# Patient Record
Sex: MALE | Race: WHITE | NOT HISPANIC OR LATINO | Employment: OTHER | URBAN - METROPOLITAN AREA
[De-identification: names, ages, dates, MRNs, and addresses within clinical notes are randomized per-mention and may not be internally consistent; named-entity substitution may affect disease eponyms.]

---

## 2023-11-01 DIAGNOSIS — E78.5 HYPERLIPIDEMIA, UNSPECIFIED HYPERLIPIDEMIA TYPE: Primary | ICD-10-CM

## 2023-11-01 RX ORDER — SIMVASTATIN 40 MG/1
40 TABLET, FILM COATED ORAL DAILY
COMMUNITY
End: 2023-11-01 | Stop reason: SDUPTHER

## 2023-11-01 RX ORDER — SIMVASTATIN 40 MG/1
40 TABLET, FILM COATED ORAL DAILY
Qty: 90 TABLET | Refills: 0 | Status: SHIPPED | OUTPATIENT
Start: 2023-11-01 | End: 2024-02-07

## 2024-02-06 DIAGNOSIS — E78.5 HYPERLIPIDEMIA, UNSPECIFIED HYPERLIPIDEMIA TYPE: ICD-10-CM

## 2024-02-06 PROBLEM — S64.40XA INJURY OF DIGITAL NERVE OF FINGER OF LEFT HAND: Status: ACTIVE | Noted: 2021-11-15

## 2024-02-06 PROBLEM — J61 PULMONARY ASBESTOSIS (MULTI): Status: ACTIVE | Noted: 2024-02-06

## 2024-02-06 PROBLEM — N40.0 ENLARGED PROSTATE WITHOUT LOWER URINARY TRACT SYMPTOMS (LUTS): Status: ACTIVE | Noted: 2024-02-06

## 2024-02-06 PROBLEM — B02.9 HERPES ZOSTER WITHOUT COMPLICATION: Status: ACTIVE | Noted: 2024-02-06

## 2024-02-06 PROBLEM — D64.9 ANEMIA: Status: ACTIVE | Noted: 2024-02-06

## 2024-02-06 PROBLEM — S61.209A FLEXOR TENDON LACERATION, FINGER, OPEN WOUND, INITIAL ENCOUNTER: Status: ACTIVE | Noted: 2021-11-15

## 2024-02-06 PROBLEM — S62.609B: Chronic | Status: ACTIVE | Noted: 2021-11-15

## 2024-02-06 PROBLEM — R91.1 LUNG NODULE, SOLITARY: Status: ACTIVE | Noted: 2024-02-06

## 2024-02-06 PROBLEM — M25.649 STIFFNESS OF THUMB JOINT: Status: ACTIVE | Noted: 2022-01-25

## 2024-02-06 PROBLEM — R73.9 HYPERGLYCEMIA: Status: ACTIVE | Noted: 2024-02-06

## 2024-02-06 PROBLEM — S56.129A FLEXOR TENDON LACERATION, FINGER, OPEN WOUND, INITIAL ENCOUNTER: Status: ACTIVE | Noted: 2021-11-15

## 2024-02-06 PROBLEM — I25.10 CAD (CORONARY ARTERY DISEASE): Status: ACTIVE | Noted: 2024-02-06

## 2024-02-06 PROBLEM — E55.9 VITAMIN D DEFICIENCY: Status: ACTIVE | Noted: 2024-02-06

## 2024-02-06 PROBLEM — M54.9 BACKACHE: Status: ACTIVE | Noted: 2024-02-06

## 2024-02-06 PROBLEM — H93.13 TINNITUS OF BOTH EARS: Status: ACTIVE | Noted: 2024-02-06

## 2024-02-06 PROBLEM — M25.642 FINGER STIFFNESS, LEFT: Chronic | Status: ACTIVE | Noted: 2022-02-11

## 2024-02-06 RX ORDER — PREDNISOLONE ACETATE 10 MG/ML
1 SUSPENSION/ DROPS OPHTHALMIC 4 TIMES DAILY
COMMUNITY
Start: 2024-01-04

## 2024-02-06 RX ORDER — TAMSULOSIN HYDROCHLORIDE 0.4 MG/1
0.4 CAPSULE ORAL DAILY
COMMUNITY

## 2024-02-06 RX ORDER — TRIAMCINOLONE ACETONIDE 1 MG/G
1 CREAM TOPICAL 2 TIMES DAILY
COMMUNITY
Start: 2013-01-25

## 2024-02-06 RX ORDER — DOCUSATE SODIUM 100 MG/1
100 CAPSULE, LIQUID FILLED ORAL 2 TIMES DAILY
COMMUNITY
Start: 2021-11-18

## 2024-02-06 RX ORDER — LATANOPROST 50 UG/ML
1 SOLUTION/ DROPS OPHTHALMIC NIGHTLY
COMMUNITY

## 2024-02-06 RX ORDER — OFLOXACIN 3 MG/ML
1 SOLUTION/ DROPS OPHTHALMIC 4 TIMES DAILY
COMMUNITY
Start: 2024-01-04

## 2024-02-06 RX ORDER — ACETAMINOPHEN 500 MG
500 TABLET ORAL 2 TIMES DAILY
COMMUNITY
Start: 2021-11-18

## 2024-02-06 RX ORDER — SILDENAFIL CITRATE 20 MG/1
2 TABLET ORAL
COMMUNITY
Start: 2021-02-05

## 2024-02-07 RX ORDER — SIMVASTATIN 40 MG/1
40 TABLET, FILM COATED ORAL DAILY
Qty: 90 TABLET | Refills: 0 | Status: SHIPPED | OUTPATIENT
Start: 2024-02-07 | End: 2024-05-01

## 2024-05-01 DIAGNOSIS — E78.5 HYPERLIPIDEMIA, UNSPECIFIED HYPERLIPIDEMIA TYPE: ICD-10-CM

## 2024-05-01 RX ORDER — SIMVASTATIN 40 MG/1
40 TABLET, FILM COATED ORAL DAILY
Qty: 90 TABLET | Refills: 0 | Status: SHIPPED | OUTPATIENT
Start: 2024-05-01

## 2024-07-27 DIAGNOSIS — E78.5 HYPERLIPIDEMIA, UNSPECIFIED HYPERLIPIDEMIA TYPE: ICD-10-CM

## 2024-07-30 RX ORDER — SIMVASTATIN 40 MG/1
40 TABLET, FILM COATED ORAL DAILY
Qty: 90 TABLET | Refills: 0 | Status: SHIPPED | OUTPATIENT
Start: 2024-07-30

## 2024-08-05 ENCOUNTER — APPOINTMENT (OUTPATIENT)
Dept: PRIMARY CARE | Facility: CLINIC | Age: 84
End: 2024-08-05
Payer: MEDICARE

## 2024-08-05 VITALS
SYSTOLIC BLOOD PRESSURE: 123 MMHG | WEIGHT: 163 LBS | HEART RATE: 87 BPM | DIASTOLIC BLOOD PRESSURE: 75 MMHG | TEMPERATURE: 98 F | BODY MASS INDEX: 25.58 KG/M2 | HEIGHT: 67 IN

## 2024-08-05 DIAGNOSIS — E78.2 MIXED HYPERLIPIDEMIA: ICD-10-CM

## 2024-08-05 DIAGNOSIS — E55.9 VITAMIN D DEFICIENCY: ICD-10-CM

## 2024-08-05 DIAGNOSIS — N40.0 ENLARGED PROSTATE WITHOUT LOWER URINARY TRACT SYMPTOMS (LUTS): ICD-10-CM

## 2024-08-05 DIAGNOSIS — Z00.00 HEALTH MAINTENANCE EXAMINATION: Primary | ICD-10-CM

## 2024-08-05 DIAGNOSIS — E78.5 HYPERLIPIDEMIA, UNSPECIFIED HYPERLIPIDEMIA TYPE: ICD-10-CM

## 2024-08-05 DIAGNOSIS — Z23 ENCOUNTER FOR IMMUNIZATION: ICD-10-CM

## 2024-08-05 DIAGNOSIS — Z13.29 THYROID DISORDER SCREENING: ICD-10-CM

## 2024-08-05 PROBLEM — B02.9 HERPES ZOSTER WITHOUT COMPLICATION: Status: RESOLVED | Noted: 2024-02-06 | Resolved: 2024-08-05

## 2024-08-05 PROBLEM — D64.9 ANEMIA: Status: RESOLVED | Noted: 2024-02-06 | Resolved: 2024-08-05

## 2024-08-05 PROBLEM — S61.209A FLEXOR TENDON LACERATION, FINGER, OPEN WOUND, INITIAL ENCOUNTER: Status: RESOLVED | Noted: 2021-11-15 | Resolved: 2024-08-05

## 2024-08-05 PROBLEM — M25.649 STIFFNESS OF THUMB JOINT: Status: RESOLVED | Noted: 2022-01-25 | Resolved: 2024-08-05

## 2024-08-05 PROBLEM — S62.609B: Chronic | Status: RESOLVED | Noted: 2021-11-15 | Resolved: 2024-08-05

## 2024-08-05 PROBLEM — M25.642 FINGER STIFFNESS, LEFT: Chronic | Status: RESOLVED | Noted: 2022-02-11 | Resolved: 2024-08-05

## 2024-08-05 PROBLEM — R91.1 LUNG NODULE, SOLITARY: Status: RESOLVED | Noted: 2024-02-06 | Resolved: 2024-08-05

## 2024-08-05 PROBLEM — S64.40XA INJURY OF DIGITAL NERVE OF FINGER OF LEFT HAND: Status: RESOLVED | Noted: 2021-11-15 | Resolved: 2024-08-05

## 2024-08-05 PROBLEM — S56.129A FLEXOR TENDON LACERATION, FINGER, OPEN WOUND, INITIAL ENCOUNTER: Status: RESOLVED | Noted: 2021-11-15 | Resolved: 2024-08-05

## 2024-08-05 PROBLEM — J61 PULMONARY ASBESTOSIS (MULTI): Status: RESOLVED | Noted: 2024-02-06 | Resolved: 2024-08-05

## 2024-08-05 PROBLEM — M54.9 BACKACHE: Status: RESOLVED | Noted: 2024-02-06 | Resolved: 2024-08-05

## 2024-08-05 PROCEDURE — 1159F MED LIST DOCD IN RCRD: CPT | Performed by: INTERNAL MEDICINE

## 2024-08-05 PROCEDURE — G0009 ADMIN PNEUMOCOCCAL VACCINE: HCPCS | Performed by: INTERNAL MEDICINE

## 2024-08-05 PROCEDURE — 1170F FXNL STATUS ASSESSED: CPT | Performed by: INTERNAL MEDICINE

## 2024-08-05 PROCEDURE — 99397 PER PM REEVAL EST PAT 65+ YR: CPT | Performed by: INTERNAL MEDICINE

## 2024-08-05 PROCEDURE — 1036F TOBACCO NON-USER: CPT | Performed by: INTERNAL MEDICINE

## 2024-08-05 PROCEDURE — G0439 PPPS, SUBSEQ VISIT: HCPCS | Performed by: INTERNAL MEDICINE

## 2024-08-05 PROCEDURE — 90677 PCV20 VACCINE IM: CPT | Performed by: INTERNAL MEDICINE

## 2024-08-05 PROCEDURE — 1160F RVW MEDS BY RX/DR IN RCRD: CPT | Performed by: INTERNAL MEDICINE

## 2024-08-05 PROCEDURE — 99213 OFFICE O/P EST LOW 20 MIN: CPT | Performed by: INTERNAL MEDICINE

## 2024-08-05 RX ORDER — SIMVASTATIN 40 MG/1
40 TABLET, FILM COATED ORAL DAILY
Qty: 90 TABLET | Refills: 3 | Status: SHIPPED | OUTPATIENT
Start: 2024-08-05 | End: 2025-08-05

## 2024-08-05 ASSESSMENT — ACTIVITIES OF DAILY LIVING (ADL)
HEARING - RIGHT EAR: FUNCTIONAL
HEARING - LEFT EAR: FUNCTIONAL
GROCERY_SHOPPING: INDEPENDENT
ADEQUATE_TO_COMPLETE_ADL: YES
TOILETING: INDEPENDENT
DRESSING YOURSELF: INDEPENDENT
FEEDING YOURSELF: INDEPENDENT
WALKS IN HOME: INDEPENDENT
JUDGMENT_ADEQUATE_SAFELY_COMPLETE_DAILY_ACTIVITIES: YES
GROOMING: INDEPENDENT
TAKING_MEDICATION: INDEPENDENT
PATIENT'S MEMORY ADEQUATE TO SAFELY COMPLETE DAILY ACTIVITIES?: YES
BATHING: INDEPENDENT
DOING_HOUSEWORK: INDEPENDENT
MANAGING_FINANCES: INDEPENDENT

## 2024-08-05 ASSESSMENT — ENCOUNTER SYMPTOMS
COUGH: 0
DIARRHEA: 0
OCCASIONAL FEELINGS OF UNSTEADINESS: 0
DIZZINESS: 0
BLOOD IN STOOL: 0
LIGHT-HEADEDNESS: 0
SHORTNESS OF BREATH: 0
DYSURIA: 0
CHILLS: 0
SORE THROAT: 0
FEVER: 0
LOSS OF SENSATION IN FEET: 0
AGITATION: 0
PALPITATIONS: 0
CONSTIPATION: 0
VOMITING: 0
DEPRESSION: 0
ABDOMINAL PAIN: 0
NAUSEA: 0

## 2024-08-05 ASSESSMENT — COLUMBIA-SUICIDE SEVERITY RATING SCALE - C-SSRS
1. IN THE PAST MONTH, HAVE YOU WISHED YOU WERE DEAD OR WISHED YOU COULD GO TO SLEEP AND NOT WAKE UP?: NO
6. HAVE YOU EVER DONE ANYTHING, STARTED TO DO ANYTHING, OR PREPARED TO DO ANYTHING TO END YOUR LIFE?: NO
2. HAVE YOU ACTUALLY HAD ANY THOUGHTS OF KILLING YOURSELF?: NO

## 2024-08-05 ASSESSMENT — PATIENT HEALTH QUESTIONNAIRE - PHQ9
SUM OF ALL RESPONSES TO PHQ9 QUESTIONS 1 AND 2: 0
1. LITTLE INTEREST OR PLEASURE IN DOING THINGS: NOT AT ALL
2. FEELING DOWN, DEPRESSED OR HOPELESS: NOT AT ALL

## 2024-08-05 NOTE — PROGRESS NOTES
Subjective   Reason for Visit: Temo Becerra is an 84 y.o. male here for a Medicare Wellness visit.     Past Medical, Surgical, and Family History reviewed and updated in chart.    Reviewed all medications by prescribing practitioner or clinical pharmacist (such as prescriptions, OTCs, herbal therapies and supplements) and documented in the medical record.    HPI  Patient presents for AWV. Patient follows with urology once a year and an ophthalmologist every year.  Patient is up-to-date on age and gender recommended screening.  Patient up-to-date on age and gender recommended immunizations exception of pneumonia vaccine which will be given today will be due for influenza vaccine this fall.  Denies any fever, chills, chest pain or shortness of breath, nausea, vomit diarrhea, abdominal pain.  He is due for blood work as well which has been ordered.  We reviewed his most recent blood work from August 2023. He is currently on simvastatin with good control of cholesterol.  States he was being monitored for asbestosis as he had possible exposure to younger age but was told he did not need any further surveillance.  Does not actually have a history of asbestosis himself.  Denies any history of diabetes or prostate cancer.  Does follow with urology for enlarged prostate currently on Flomax.  Following with podiatry for mycotic toenails and dermatophytosis of nail    Patient Care Team:  Irving Agarwal DO as PCP - General (Internal Medicine)  Doug Emanuel MD as PCP - [employer] PCP     Review of Systems   Constitutional:  Negative for chills and fever.   HENT:  Negative for sore throat.    Eyes:  Negative for visual disturbance.   Respiratory:  Negative for cough and shortness of breath.    Cardiovascular:  Negative for chest pain, palpitations and leg swelling.   Gastrointestinal:  Negative for abdominal pain, blood in stool, constipation, diarrhea, nausea and vomiting.   Genitourinary:  Negative for  "dysuria.   Skin:  Negative for rash.   Neurological:  Negative for dizziness, syncope and light-headedness.   Psychiatric/Behavioral:  Negative for agitation.        Objective   Vitals:  /75   Pulse 87   Temp 36.7 °C (98 °F) (Temporal)   Ht 1.702 m (5' 7\")   Wt 73.9 kg (163 lb)   BMI 25.53 kg/m²       Physical Exam  Vitals and nursing note reviewed.   Constitutional:       General: He is not in acute distress.     Appearance: Normal appearance. He is not ill-appearing, toxic-appearing or diaphoretic.   HENT:      Mouth/Throat:      Mouth: Mucous membranes are moist.      Pharynx: Oropharynx is clear.   Eyes:      Extraocular Movements: Extraocular movements intact.      Pupils: Pupils are equal, round, and reactive to light.   Neck:      Vascular: No carotid bruit.   Cardiovascular:      Rate and Rhythm: Normal rate and regular rhythm.      Heart sounds: Normal heart sounds.   Pulmonary:      Effort: Pulmonary effort is normal. No respiratory distress.      Breath sounds: Normal breath sounds. No wheezing, rhonchi or rales.   Abdominal:      General: There is no distension.      Palpations: Abdomen is soft. There is no mass.      Tenderness: There is no abdominal tenderness.   Musculoskeletal:         General: No swelling.      Cervical back: Neck supple. No tenderness.      Right lower leg: No edema.      Left lower leg: No edema.   Lymphadenopathy:      Cervical: No cervical adenopathy.   Skin:     General: Skin is warm and dry.      Coloration: Skin is not jaundiced.      Findings: No bruising or rash.   Neurological:      General: No focal deficit present.      Mental Status: He is alert and oriented to person, place, and time.      Cranial Nerves: No cranial nerve deficit.   Psychiatric:         Mood and Affect: Mood normal.         Behavior: Behavior normal.         Thought Content: Thought content normal.         Judgment: Judgment normal.         Assessment/Plan   Problem List Items Addressed This " Visit             ICD-10-CM    Enlarged prostate without lower urinary tract symptoms (luts) N40.0    Hyperlipidemia E78.5    Relevant Medications    simvastatin (Zocor) 40 mg tablet    Other Relevant Orders    CBC and Auto Differential    Comprehensive metabolic panel    Lipid panel    Vitamin D deficiency E55.9    Relevant Orders    Vitamin D 25-Hydroxy,Total (for eval of Vitamin D levels)    Health maintenance examination - Primary Z00.00    Encounter for immunization Z23    Relevant Orders    Pneumococcal conjugate vaccine, 20-valent (PREVNAR 20) (Completed)    Thyroid disorder screening Z13.29    Relevant Orders    TSH with reflex to Free T4 if abnormal     Health maintenance examination: Patient is up-to-date on age and gender recommended screening.  He is up-to-date on age and gender recommended immunizations with exception of Prevnar 20 which will be given today.  He defers influenza and RSV vaccine at this time.  Defers COVID-19 vaccination.  States he was remote smoker very sparingly but quit over 40 years ago.  He is due for blood work which has been ordered today    Large prostate without lower urinary tract symptoms: Following with urology on Flomax and tolerating it well without adverse effects symptoms controlled.    Hyperlipidemia: Chronic, stable refills provided for simvastatin blood work has been ordered to check cholesterol level    Vitamin D deficiency: Check a vitamin D level    Encounter for immunization: Prevnar 20 will be given today

## 2024-08-08 LAB
NON-UH HIE A/G RATIO: 1.3
NON-UH HIE ALB: 3.8 G/DL (ref 3.4–5)
NON-UH HIE ALK PHOS: 93 UNIT/L (ref 45–117)
NON-UH HIE BASO COUNT: 0.03 X1000 (ref 0–0.2)
NON-UH HIE BASOS %: 0.6 %
NON-UH HIE BILIRUBIN, TOTAL: 0.5 MG/DL (ref 0.3–1.2)
NON-UH HIE BUN/CREAT RATIO: 12.7
NON-UH HIE BUN: 14 MG/DL (ref 9–23)
NON-UH HIE CALCIUM: 9.3 MG/DL (ref 8.7–10.4)
NON-UH HIE CALCULATED LDL CHOLESTEROL: 58 MG/DL (ref 60–130)
NON-UH HIE CALCULATED OSMOLALITY: 289 MOSM/KG (ref 275–295)
NON-UH HIE CHLORIDE: 109 MMOL/L (ref 98–107)
NON-UH HIE CHOLESTEROL: 120 MG/DL (ref 100–200)
NON-UH HIE CO2, VENOUS: 29 MMOL/L (ref 20–31)
NON-UH HIE CREATININE: 1.1 MG/DL (ref 0.6–1.1)
NON-UH HIE DIFF?: NO
NON-UH HIE EOS COUNT: 0.19 X1000 (ref 0–0.5)
NON-UH HIE EOSIN %: 3.6 %
NON-UH HIE GFR AA: >60
NON-UH HIE GLOBULIN: 3 G/DL
NON-UH HIE GLOMERULAR FILTRATION RATE: >60 ML/MIN/1.73M?
NON-UH HIE GLUCOSE: 121 MG/DL (ref 74–106)
NON-UH HIE GOT: 19 UNIT/L (ref 15–37)
NON-UH HIE GPT: 20 UNIT/L (ref 10–49)
NON-UH HIE HCT: 43.5 % (ref 41–52)
NON-UH HIE HDL CHOLESTEROL: 51 MG/DL (ref 40–60)
NON-UH HIE HGB: 14.6 G/DL (ref 13.5–17.5)
NON-UH HIE INSTR WBC: 5.3
NON-UH HIE K: 4.2 MMOL/L (ref 3.5–5.1)
NON-UH HIE LYMPH %: 30.3 %
NON-UH HIE LYMPH COUNT: 1.61 X1000 (ref 1.2–4.8)
NON-UH HIE MCH: 29.8 PG (ref 27–34)
NON-UH HIE MCHC: 33.6 G/DL (ref 32–37)
NON-UH HIE MCV: 88.7 FL (ref 80–100)
NON-UH HIE MONO %: 8.2 %
NON-UH HIE MONO COUNT: 0.44 X1000 (ref 0.1–1)
NON-UH HIE MPV: 8.2 FL (ref 7.4–10.4)
NON-UH HIE NA: 144 MMOL/L (ref 135–145)
NON-UH HIE NEUTROPHIL %: 57.2 %
NON-UH HIE NEUTROPHIL COUNT (ANC): 3.04 X1000 (ref 1.4–8.8)
NON-UH HIE NUCLEATED RBC: 0 /100WBC
NON-UH HIE PLATELET: 182 X10 (ref 150–450)
NON-UH HIE RBC: 4.9 X10 (ref 4.7–6.1)
NON-UH HIE RDW: 12.7 % (ref 11.5–14.5)
NON-UH HIE TOTAL CHOL/HDL CHOL RATIO: 2.4
NON-UH HIE TOTAL PROTEIN: 6.8 G/DL (ref 5.7–8.2)
NON-UH HIE TRIGLYCERIDES: 54 MG/DL (ref 30–150)
NON-UH HIE TSH: 3.38 UIU/ML (ref 0.55–4.78)
NON-UH HIE VIT D 25: 41 NG/ML
NON-UH HIE WBC: 5.3 X10 (ref 4.5–11)

## 2025-02-07 ENCOUNTER — APPOINTMENT (OUTPATIENT)
Dept: PRIMARY CARE | Facility: CLINIC | Age: 85
End: 2025-02-07
Payer: MEDICARE

## 2025-02-07 VITALS
BODY MASS INDEX: 26.53 KG/M2 | TEMPERATURE: 97.5 F | HEIGHT: 67 IN | SYSTOLIC BLOOD PRESSURE: 130 MMHG | WEIGHT: 169 LBS | HEART RATE: 80 BPM | DIASTOLIC BLOOD PRESSURE: 76 MMHG

## 2025-02-07 DIAGNOSIS — R73.01 IMPAIRED FASTING GLUCOSE: ICD-10-CM

## 2025-02-07 DIAGNOSIS — M62.830 BACK MUSCLE SPASM: Primary | ICD-10-CM

## 2025-02-07 DIAGNOSIS — E55.9 VITAMIN D DEFICIENCY: ICD-10-CM

## 2025-02-07 DIAGNOSIS — E78.2 MIXED HYPERLIPIDEMIA: ICD-10-CM

## 2025-02-07 PROCEDURE — 1158F ADVNC CARE PLAN TLK DOCD: CPT | Performed by: INTERNAL MEDICINE

## 2025-02-07 PROCEDURE — 1123F ACP DISCUSS/DSCN MKR DOCD: CPT | Performed by: INTERNAL MEDICINE

## 2025-02-07 PROCEDURE — 1160F RVW MEDS BY RX/DR IN RCRD: CPT | Performed by: INTERNAL MEDICINE

## 2025-02-07 PROCEDURE — 99213 OFFICE O/P EST LOW 20 MIN: CPT | Performed by: INTERNAL MEDICINE

## 2025-02-07 PROCEDURE — 1159F MED LIST DOCD IN RCRD: CPT | Performed by: INTERNAL MEDICINE

## 2025-02-07 RX ORDER — TIZANIDINE 2 MG/1
2 TABLET ORAL EVERY 6 HOURS PRN
Qty: 30 TABLET | Refills: 0 | Status: SHIPPED | OUTPATIENT
Start: 2025-02-07 | End: 2025-02-17

## 2025-02-07 NOTE — PROGRESS NOTES
"Subjective   Patient ID: Temo Becerra is a 84 y.o. male who presents for Follow-up (6 month /He had a back spasm about 9 days ago.  Wants to discuss this./We wants to discuss possible meds next time has this.).    HPI Patient had a back strain 9 days ago. He works out in the gym and a bookcase fell down and he went to  the books and this caused his \"back to go out\".  Patient went to his chiropractor, iced the back, and took Aleve. Pain is located across the low back but is now resolved. No loss of bowel or bladder control. No saddle anesthesia.  Patient would like to have a medication on hand in the event this occurs again in the future. Denies leg weakness or numbness.     Patient sees a urologist for enlarged prostate. Patient is on flomax.     Patient's last labs were completed on 8/8/2024.  Patient would like to complete repeat labs at this time.  BP well controlled for his age group as we discussed.  Patient denies any fever, chills, chest pain, shortness of breath, nausea, diarrhea, abdominal pain.      Review of Systems   Constitutional:  Negative for chills and fever.   HENT:  Negative for sore throat.    Eyes:  Negative for visual disturbance.   Respiratory:  Negative for cough and shortness of breath.    Cardiovascular:  Negative for chest pain.   Gastrointestinal:  Negative for abdominal pain, nausea and vomiting.   Musculoskeletal:  Positive for back pain (resolved).   Skin:  Negative for rash.   Neurological:  Negative for dizziness, syncope, weakness, light-headedness and numbness.   Psychiatric/Behavioral:  Negative for agitation and confusion.        Objective   /76 (BP Location: Right arm, Patient Position: Sitting, BP Cuff Size: Adult)   Pulse 80   Temp 36.4 °C (97.5 °F)   Ht 1.702 m (5' 7\")   Wt 76.7 kg (169 lb)   BMI 26.47 kg/m²     Physical Exam  Vitals and nursing note reviewed.   Constitutional:       General: He is not in acute distress.     Appearance: Normal appearance. " He is not ill-appearing, toxic-appearing or diaphoretic.   HENT:      Head: Normocephalic and atraumatic.      Mouth/Throat:      Mouth: Mucous membranes are moist.      Pharynx: Oropharynx is clear.   Cardiovascular:      Rate and Rhythm: Normal rate and regular rhythm.      Heart sounds: Normal heart sounds.   Pulmonary:      Effort: Pulmonary effort is normal. No respiratory distress.      Breath sounds: Normal breath sounds. No wheezing, rhonchi or rales.   Musculoskeletal:      Cervical back: Neck supple.      Right lower leg: No edema.      Left lower leg: No edema.   Lymphadenopathy:      Cervical: No cervical adenopathy.   Skin:     General: Skin is warm and dry.      Findings: No rash.   Neurological:      General: No focal deficit present.      Mental Status: He is alert. Mental status is at baseline.   Psychiatric:         Mood and Affect: Mood normal.         Behavior: Behavior normal.         Thought Content: Thought content normal.         Judgment: Judgment normal.       Assessment/Plan   Problem List Items Addressed This Visit             ICD-10-CM    Hyperlipidemia E78.5    Relevant Orders    Comprehensive metabolic panel    Lipid panel    CBC and Auto Differential    Vitamin D deficiency E55.9    Relevant Orders    Vitamin D 25-Hydroxy,Total (for eval of Vitamin D levels)    Back muscle spasm - Primary M62.830    Relevant Medications    tiZANidine (Zanaflex) 2 mg tablet    Impaired fasting glucose R73.01    Relevant Orders    Hemoglobin A1C     Hyperlipidemia: Chronic, stable continue simvastatin.  Follow-up in 6 months for recheck    Vitamin D deficiency: Will check a vitamin D level     Back muscle spasm: Patient recently had what sounds like a lumbar strain he will occasionally have this happen to him but it has been a long while since he has had a flare.  He would like to have a small quantity of muscle relaxer on hand in case this would happen in the future to use as needed.  We have  prescribed tizanidine discussed the risk and benefits of the medication.  Advised not to drive or operate machinery when using the medication best to use at bedtime only.  Do not drink alcohol with the medication.  If he would have persistent back symptoms despite using the medication he would follow-up in the office for recheck.    Impaired fasting glucose: Will check hemoglobin A1c prior to next office visit

## 2025-02-12 ASSESSMENT — ENCOUNTER SYMPTOMS
WEAKNESS: 0
ABDOMINAL PAIN: 0
NUMBNESS: 0
SORE THROAT: 0
AGITATION: 0
BACK PAIN: 1
LIGHT-HEADEDNESS: 0
FEVER: 0
NAUSEA: 0
CHILLS: 0
SHORTNESS OF BREATH: 0
DIZZINESS: 0
CONFUSION: 0
COUGH: 0
VOMITING: 0

## 2025-02-26 ENCOUNTER — TELEPHONE (OUTPATIENT)
Dept: PRIMARY CARE | Facility: CLINIC | Age: 85
End: 2025-02-26
Payer: MEDICARE

## 2025-02-26 LAB
25(OH)D3+25(OH)D2 SERPL-MCNC: 55 NG/ML (ref 30–100)
ALBUMIN SERPL-MCNC: 4.3 G/DL (ref 3.6–5.1)
ALP SERPL-CCNC: 68 U/L (ref 35–144)
ALT SERPL-CCNC: 14 U/L (ref 9–46)
ANION GAP SERPL CALCULATED.4IONS-SCNC: 6 MMOL/L (CALC) (ref 7–17)
AST SERPL-CCNC: 14 U/L (ref 10–35)
BASOPHILS # BLD AUTO: 41 CELLS/UL (ref 0–200)
BASOPHILS NFR BLD AUTO: 0.8 %
BILIRUB SERPL-MCNC: 0.8 MG/DL (ref 0.2–1.2)
BUN SERPL-MCNC: 13 MG/DL (ref 7–25)
CALCIUM SERPL-MCNC: 9.6 MG/DL (ref 8.6–10.3)
CHLORIDE SERPL-SCNC: 106 MMOL/L (ref 98–110)
CHOLEST SERPL-MCNC: 126 MG/DL
CHOLEST/HDLC SERPL: 2.5 (CALC)
CO2 SERPL-SCNC: 29 MMOL/L (ref 20–32)
CREAT SERPL-MCNC: 1.09 MG/DL (ref 0.7–1.22)
EGFRCR SERPLBLD CKD-EPI 2021: 68 ML/MIN/1.73M2
EOSINOPHIL # BLD AUTO: 107 CELLS/UL (ref 15–500)
EOSINOPHIL NFR BLD AUTO: 2.1 %
ERYTHROCYTE [DISTWIDTH] IN BLOOD BY AUTOMATED COUNT: 12.1 % (ref 11–15)
GLUCOSE SERPL-MCNC: 113 MG/DL (ref 65–99)
HCT VFR BLD AUTO: 44.1 % (ref 38.5–50)
HDLC SERPL-MCNC: 51 MG/DL
HGB BLD-MCNC: 14.6 G/DL (ref 13.2–17.1)
LDLC SERPL CALC-MCNC: 57 MG/DL (CALC)
LYMPHOCYTES # BLD AUTO: 1413 CELLS/UL (ref 850–3900)
LYMPHOCYTES NFR BLD AUTO: 27.7 %
MCH RBC QN AUTO: 30 PG (ref 27–33)
MCHC RBC AUTO-ENTMCNC: 33.1 G/DL (ref 32–36)
MCV RBC AUTO: 90.6 FL (ref 80–100)
MONOCYTES # BLD AUTO: 398 CELLS/UL (ref 200–950)
MONOCYTES NFR BLD AUTO: 7.8 %
NEUTROPHILS # BLD AUTO: 3142 CELLS/UL (ref 1500–7800)
NEUTROPHILS NFR BLD AUTO: 61.6 %
NONHDLC SERPL-MCNC: 75 MG/DL (CALC)
PLATELET # BLD AUTO: 193 THOUSAND/UL (ref 140–400)
PMV BLD REES-ECKER: 10 FL (ref 7.5–12.5)
POTASSIUM SERPL-SCNC: 4.9 MMOL/L (ref 3.5–5.3)
PROT SERPL-MCNC: 6.5 G/DL (ref 6.1–8.1)
RBC # BLD AUTO: 4.87 MILLION/UL (ref 4.2–5.8)
SODIUM SERPL-SCNC: 141 MMOL/L (ref 135–146)
TRIGL SERPL-MCNC: 95 MG/DL
TSH SERPL-ACNC: 2.58 MIU/L (ref 0.4–4.5)
WBC # BLD AUTO: 5.1 THOUSAND/UL (ref 3.8–10.8)

## 2025-02-26 NOTE — RESULT ENCOUNTER NOTE
Blood work showed fasting sugar mildly elevated, cholesterol well-controlled, thyroid level and vitamin D are normal.  Blood counts are normal.

## 2025-02-26 NOTE — TELEPHONE ENCOUNTER
----- Message from Irving Agarwal sent at 2/26/2025  8:12 AM EST -----  Blood work showed fasting sugar mildly elevated, cholesterol well-controlled, thyroid level and vitamin D are normal.  Blood counts are normal.

## 2025-06-03 ENCOUNTER — TELEPHONE (OUTPATIENT)
Dept: PRIMARY CARE | Facility: CLINIC | Age: 85
End: 2025-06-03
Payer: MEDICARE

## 2025-08-11 ENCOUNTER — APPOINTMENT (OUTPATIENT)
Dept: PRIMARY CARE | Facility: CLINIC | Age: 85
End: 2025-08-11
Payer: MEDICARE

## 2025-08-11 VITALS
BODY MASS INDEX: 25.02 KG/M2 | TEMPERATURE: 97.9 F | SYSTOLIC BLOOD PRESSURE: 120 MMHG | HEART RATE: 91 BPM | HEIGHT: 67 IN | WEIGHT: 159.4 LBS | DIASTOLIC BLOOD PRESSURE: 70 MMHG

## 2025-08-11 DIAGNOSIS — R53.83 OTHER FATIGUE: ICD-10-CM

## 2025-08-11 DIAGNOSIS — E55.9 VITAMIN D DEFICIENCY: ICD-10-CM

## 2025-08-11 DIAGNOSIS — Z23 ENCOUNTER FOR IMMUNIZATION: ICD-10-CM

## 2025-08-11 DIAGNOSIS — Z13.29 THYROID DISORDER SCREENING: ICD-10-CM

## 2025-08-11 DIAGNOSIS — Z00.00 HEALTH MAINTENANCE EXAMINATION: Primary | ICD-10-CM

## 2025-08-11 DIAGNOSIS — R73.01 IMPAIRED FASTING GLUCOSE: ICD-10-CM

## 2025-08-11 DIAGNOSIS — J30.9 ALLERGIC RHINITIS, UNSPECIFIED SEASONALITY, UNSPECIFIED TRIGGER: ICD-10-CM

## 2025-08-11 DIAGNOSIS — E78.2 MIXED HYPERLIPIDEMIA: ICD-10-CM

## 2025-08-11 PROCEDURE — 99397 PER PM REEVAL EST PAT 65+ YR: CPT | Performed by: INTERNAL MEDICINE

## 2025-08-11 PROCEDURE — 99213 OFFICE O/P EST LOW 20 MIN: CPT | Performed by: INTERNAL MEDICINE

## 2025-08-11 PROCEDURE — G0439 PPPS, SUBSEQ VISIT: HCPCS | Performed by: INTERNAL MEDICINE

## 2025-08-11 PROCEDURE — 1159F MED LIST DOCD IN RCRD: CPT | Performed by: INTERNAL MEDICINE

## 2025-08-11 PROCEDURE — 1170F FXNL STATUS ASSESSED: CPT | Performed by: INTERNAL MEDICINE

## 2025-08-11 PROCEDURE — 1160F RVW MEDS BY RX/DR IN RCRD: CPT | Performed by: INTERNAL MEDICINE

## 2025-08-11 PROCEDURE — 1036F TOBACCO NON-USER: CPT | Performed by: INTERNAL MEDICINE

## 2025-08-11 RX ORDER — AZELASTINE 1 MG/ML
2 SPRAY, METERED NASAL 2 TIMES DAILY
Qty: 30 ML | Refills: 12 | Status: SHIPPED | OUTPATIENT
Start: 2025-08-11 | End: 2026-08-11

## 2025-08-11 ASSESSMENT — ENCOUNTER SYMPTOMS
DYSURIA: 0
SORE THROAT: 0
AGITATION: 0
FEVER: 0
VOMITING: 0
NAUSEA: 0
LIGHT-HEADEDNESS: 0
DIARRHEA: 0
CONSTIPATION: 0
CHILLS: 0
CONFUSION: 0
PALPITATIONS: 0
ABDOMINAL PAIN: 0
BLOOD IN STOOL: 0
DIZZINESS: 0
SHORTNESS OF BREATH: 0
COUGH: 0
RHINORRHEA: 1

## 2025-08-11 ASSESSMENT — ACTIVITIES OF DAILY LIVING (ADL)
BATHING: INDEPENDENT
DOING_HOUSEWORK: INDEPENDENT
MANAGING_FINANCES: INDEPENDENT
DRESSING: INDEPENDENT
TAKING_MEDICATION: INDEPENDENT
GROCERY_SHOPPING: INDEPENDENT

## 2025-08-11 ASSESSMENT — PATIENT HEALTH QUESTIONNAIRE - PHQ9
2. FEELING DOWN, DEPRESSED OR HOPELESS: NOT AT ALL
1. LITTLE INTEREST OR PLEASURE IN DOING THINGS: NOT AT ALL
SUM OF ALL RESPONSES TO PHQ9 QUESTIONS 1 AND 2: 0

## 2026-02-09 ENCOUNTER — APPOINTMENT (OUTPATIENT)
Dept: PRIMARY CARE | Facility: CLINIC | Age: 86
End: 2026-02-09
Payer: MEDICARE